# Patient Record
(demographics unavailable — no encounter records)

---

## 2025-02-28 NOTE — HISTORY OF PRESENT ILLNESS
[de-identified] : 75M here for initial evaluation   He c/o left sided facial pressure and green foul-smelling d/c from his nose for the past 2-3 months. Sometimes d/c will drain down the back of throat which has a bitter taste. For many years, he has nasal congestion L worse than R. No hx of abx for sinusitis in the past. No allergies or asthma. He saw an ENT who ordered a CT scan and was told he had sinusitis- was started on amoxicillin and was referred here for follow up. He states he's had dental work in the [facility] [place] and the [place]. He states most recent dental work was bridge placement 15 [place] ago [place]. He does not think he is draining anything into his mouth from his jaw.  CT Sinus 12/6/24 (I reviewed) -severe pansinus mucosal disease w complete left maxillary, anterior ethmoid and frontal opacification w osteitic bone -bilateral posterior maxillary molar remnants w mild apical expansion and erosion of alveolus, L>>R, with implants of permanent bridge a bit more anteriorly, also in the maxillaries.  ROS otherwise unremarkable

## 2025-02-28 NOTE — PHYSICAL EXAM
[Midline] : trachea located in midline position [Normal] : no rashes [de-identified] : bridge in place

## 2025-02-28 NOTE — DATA REVIEWED
[de-identified] : CT Sinus 12/6/24: FINDINGS:    Nasal Cavity: Bony nasal septum primarily deviates leftward with a mid septal spur approaching the inferior turbinate. Cartilaginous septum better assessed directly. Prominent bilateral nasal turbinate hypertrophy. Focal mucosal thickening obscures anatomy surrounding the left middle turbinate, correlate for underlying nonspecific polyposis. Lyubov malik, cribriform plate and tegmen ethmoidalis are intact. Depth of the olfactory fossae 7 mm.  Frontal Sinuses/ Frontal Sinus Outflow: Dominant left frontal crossing midline. Near-complete opacification on the left. Mucosal thickening inferomedial on the right. Opacified frontoethmoidal recesses, obscured underlying normal anatomy.  Ethmoid Sinuses: Bilaterally and diffusely mucosal thickening. No dehiscence fovea anterior ethmoid arteries, on the left partially covered by an air cell above.  Maxillary Sinuses and Ostiomeatal Units: Near-complete opacification of the left sinus, diffuse mucosal thickening on the right. Opacified left ostiomeatal unit. Some residual air right middle meatus, otherwise opacified. Bilaterally osteitis. Bilaterally related to the posterior molars, dehiscence less likely deossification suggesting a component of odontogenic sinusitis  Sphenoid Sinuses/ Outflow: Dominant left sphenoid crossing midline, the intersinus septum attaches medial to the carotid canals. Pneumatization terminates posterior sella level. Normal size intact bony sella turcica, exam does not evaluate for microadenoma. Mucosal lines the anterior left wall, right free of significant disease. Opacified left ostium and partially the sphenoethmoidal recess, right side is patent.  Nonrelated Findings: Clear tympanomastoid cavities. No orbital or ocular mass. Intact lamina papyracea. Normal contour posterior wall of the nasopharynx. Severe right and mild left bony degenerative changes TMJs.  IMPRESSION:   1. Bony nasal septum primarily deviates leftward with a mid septal spur approaching the inferior turbinate. Cartilaginous septum better assessed directly. Prominent bilateral nasal turbinate hypertrophy. Rhinitis, mucosal thickening is more significant surrounding the left middle turbinate, correlate for underlying nonspecific polyposis. 2. Chronic pansinusitis save right sphenoid, mucosal thickening is significantly greater than 3 mm. Normal anatomy is partially obscured. Bilateral maxillary osteitis and dehiscence less likely deossification related to final posterior molars, correlate for odontogenic sinusitis. Opacified left and near complete opacification right ostiomeatal units. Opacified frontoethmoidal recesses and left sphenoid ostium and partially the sphenoethmoidal recess.  Thank you for the opportunity to participate in the care of this patient.

## 2025-02-28 NOTE — ASSESSMENT
[FreeTextEntry1] : 75M w left sided facial pressure and green foul-smelling nasal drainage the past 2-3 months with very bitter taste. For many years, he has nasal congestion L worse than R with facial pressure. He states he's had dental work in the [place] and the [place]. He states most recent dental work was bridge [place]. CT shows pansinusitis, left>right, likely all from odontogenic etiology. Nasal endoscopy shows left middle meatal occlusion w edema and yellow purulent drainage down choana. He hs chronic pansinusitis, left>right, which is odontogenic in origin. There is marked inflammatory burden on both imaging and endoscopy. At this point will start kelfex/flagyl. He will need sinus surgery as definitive treatment which was discussed at length. Thereafter, once healed, he will likely need the posterior maxillary molars extracted. All questions answered.

## 2025-02-28 NOTE — CONSULT LETTER
[Dear  ___] : Dear  [unfilled], [Courtesy Letter:] : I had the pleasure of seeing your patient, [unfilled], in my office today. [Consult Closing:] : Thank you very much for allowing me to participate in the care of this patient.  If you have any questions, please do not hesitate to contact me. [Sincerely,] : Sincerely, [FreeTextEntry3] : Seng Salomon MD Department of Otolaryngology, Head and Neck Surgery

## 2025-02-28 NOTE — PROCEDURE
[FreeTextEntry3] : Nasal Endoscopy: left middle meatal occlusion w marked edema and purulent drainage down choana. sinonasal mucosal edema throughout